# Patient Record
Sex: FEMALE | Race: WHITE | NOT HISPANIC OR LATINO | Employment: UNEMPLOYED | URBAN - METROPOLITAN AREA
[De-identification: names, ages, dates, MRNs, and addresses within clinical notes are randomized per-mention and may not be internally consistent; named-entity substitution may affect disease eponyms.]

---
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
## 2025-01-10 ENCOUNTER — APPOINTMENT (OUTPATIENT)
Dept: LAB | Facility: CLINIC | Age: 20
End: 2025-01-10
Payer: COMMERCIAL

## 2025-01-10 DIAGNOSIS — T78.1XXD ADVERSE REACTION TO FOOD, SUBSEQUENT ENCOUNTER: ICD-10-CM

## 2025-01-10 DIAGNOSIS — Z91.018 ALLERGY TO OTHER FOODS: ICD-10-CM

## 2025-01-10 PROCEDURE — 86003 ALLG SPEC IGE CRUDE XTRC EA: CPT

## 2025-01-10 PROCEDURE — 86008 ALLG SPEC IGE RECOMB EA: CPT

## 2025-01-10 PROCEDURE — 36415 COLL VENOUS BLD VENIPUNCTURE: CPT

## 2025-01-13 LAB
ALMOND IGE QN: 21.9 KUA/I (ref 0–0.1)
BRAZIL NUT IGE QN: 13.1 KUA/I (ref 0–0.1)
CASHEW NUT IGE QN: 48.9 KUA/I (ref 0–0.1)
CLAM IGE QN: 0.23 KUA/L (ref 0–0.1)
CRAB IGE QN: 0.51 KUA/L (ref 0–0.1)
HAZELNUT IGE QN: 31.9 KUA/L (ref 0–0.1)
LENTILS IGE QN: 13.5 KU/L
LOBSTER IGE QN: 0.95 KUA/L (ref 0–0.1)
MISCELLANEOUS LAB TEST RESULT: NORMAL
OYSTER IGE QN: 0.11 KUA/L (ref 0–0.1)
PEA IGE QN: 13.4 KU/L
PEANUT IGE QN: 25.7 KUA/I (ref 0–0.1)
PECAN/HICK NUT IGE QN: 22.7 KUA/I (ref 0–0.1)
PISTACHIO IGE QN: 64.9 KUA/I (ref 0–0.1)
SCALLOP IGE QN: 0.23 KUA/L (ref 0–0.1)
SESAME SEED IGE QN: 30 KUA/I (ref 0–0.1)
SHRIMP IGE QN: 3.19 KUA/L (ref 0–0.1)

## 2025-01-15 LAB
ARA H6 PEANUT: 6.46 KUA/I (ref 0–0.1)
PEANUT (RARA H) 1 IGE QN: 5.14 KUA/I (ref 0–0.1)
PEANUT (RARA H) 2 IGE QN: 9.03 KUA/I (ref 0–0.1)
PEANUT (RARA H) 3 IGE QN: 2.76 KUA/I (ref 0–0.1)
PEANUT (RARA H) 8 IGE QN: <0.1 KUA/I (ref 0–0.1)
PEANUT (RARA H) 9 IGE QN: 0.12 KUA/I (ref 0–0.1)

## 2025-01-17 LAB — MISCELLANEOUS LAB TEST RESULT: NORMAL

## 2025-02-24 ENCOUNTER — TELEPHONE (OUTPATIENT)
Dept: GASTROENTEROLOGY | Facility: CLINIC | Age: 20
End: 2025-02-24

## 2025-02-24 NOTE — TELEPHONE ENCOUNTER
Called and left a message for the patient to call back to reschedule office visit from 3/19 to 3/27 due to a change in the provider's schedule. That day and time is on hold for the patient.

## 2025-03-03 NOTE — TELEPHONE ENCOUNTER
Called and left a message for the patient to call back to reschedule office visit from 3/19 to 3/27 due to a change in the provider's schedule. That day and time is on hold for the patient. Sent letter via myc and canceled appt

## 2025-03-19 DIAGNOSIS — Z78.9 USES BIRTH CONTROL: ICD-10-CM

## 2025-03-19 RX ORDER — ETONOGESTREL AND ETHINYL ESTRADIOL VAGINAL RING .015; .12 MG/D; MG/D
RING VAGINAL
Qty: 3 EACH | Refills: 0 | Status: SHIPPED | OUTPATIENT
Start: 2025-03-19

## 2025-03-19 NOTE — TELEPHONE ENCOUNTER
Reason for call:   [x] Refill   [] Prior Auth  [] Other:     Office:   [x] PCP/Provider -   [] Specialty/Provider -     Medication: NuvaRing     Dose/Frequency: 0.12-0.015 mg/24 hr vaginal ring. Insert vaginally and leave in place for 3 consecutive weeks    Quantity: 3 each     Pharmacy: [facility]/pharm[facility] #[id number] - [place] - [address] [place] [address] [phone]     Local Pharmacy   Does the patient have enough for 3 days?   [] Yes   [x] No - Send as HP to POD    Mail Away Pharmacy   Does the patient have enough for 10 days?   [] Yes   [] No - Send as HP to POD

## 2025-05-30 ENCOUNTER — PREP FOR PROCEDURE (OUTPATIENT)
Dept: GASTROENTEROLOGY | Facility: CLINIC | Age: 20
End: 2025-05-30

## 2025-05-30 ENCOUNTER — TELEPHONE (OUTPATIENT)
Dept: GASTROENTEROLOGY | Facility: CLINIC | Age: 20
End: 2025-05-30

## 2025-05-30 DIAGNOSIS — R13.10 DYSPHAGIA, UNSPECIFIED TYPE: Primary | ICD-10-CM

## 2025-05-30 RX ORDER — SODIUM CHLORIDE, SODIUM LACTATE, POTASSIUM CHLORIDE, CALCIUM CHLORIDE 600; 310; 30; 20 MG/100ML; MG/100ML; MG/100ML; MG/100ML
125 INJECTION, SOLUTION INTRAVENOUS CONTINUOUS
OUTPATIENT
Start: 2025-05-30

## 2025-05-30 NOTE — TELEPHONE ENCOUNTER
----- Message from Irina Torrez DO sent at 5/30/2025  9:01 AM EDT -----  Regarding: schedule egd  Corrina,Will you please ask [facility] lab if they will do eGD for Meghan next week while I am the [facility] inpt doc.  She is the daughter of the chief of medicine Dr. Hernandez.  Order placed.Thank you!Magdalena

## 2025-05-30 NOTE — TELEPHONE ENCOUNTER
Scheduled date of EGD(as of today):06.06.25  Physician performing EGD:DR TORREZ  Location of EGD:[facility]  Instructions reviewed with patient by:SENT VIA [employer]  Clearances: N/A  Ok'd by Dr Torrez, Maddie Rubalcava Lm for pt on mother's vm with scheduled date and location.  Asked either pt or mother to cb to confirm or if reschedule needed

## 2025-06-05 ENCOUNTER — OFFICE VISIT (OUTPATIENT)
Dept: URGENT CARE | Facility: CLINIC | Age: 20
End: 2025-06-05
Payer: COMMERCIAL

## 2025-06-05 VITALS
RESPIRATION RATE: 16 BRPM | HEART RATE: 99 BPM | BODY MASS INDEX: 19.25 KG/M2 | WEIGHT: 126.6 LBS | OXYGEN SATURATION: 99 % | DIASTOLIC BLOOD PRESSURE: 66 MMHG | TEMPERATURE: 97.5 F | SYSTOLIC BLOOD PRESSURE: 102 MMHG

## 2025-06-05 DIAGNOSIS — J02.9 SORE THROAT: Primary | ICD-10-CM

## 2025-06-05 DIAGNOSIS — J02.0 STREP THROAT: ICD-10-CM

## 2025-06-05 LAB — S PYO AG THROAT QL: NEGATIVE

## 2025-06-05 PROCEDURE — 99203 OFFICE O/P NEW LOW 30 MIN: CPT

## 2025-06-05 PROCEDURE — 87880 STREP A ASSAY W/OPTIC: CPT

## 2025-06-05 RX ORDER — AMOXICILLIN 500 MG/1
500 CAPSULE ORAL EVERY 12 HOURS SCHEDULED
Qty: 20 CAPSULE | Refills: 0 | Status: SHIPPED | OUTPATIENT
Start: 2025-06-05 | End: 2025-06-15

## 2025-06-05 NOTE — LETTER
June 5, 2025     Patient: Meghan Hernandez   YOB: 2005   Date of Visit: 6/5/2025       To Whom It May Concern:    It is my medical opinion that Meghan Hernandez may return to work on 06/07/2025.    If you have any questions or concerns, please don't hesitate to call.         Sincerely,        RAQUEL Meyers    CC: No Recipients

## 2025-06-05 NOTE — PATIENT INSTRUCTIONS
Saline nasal spray as often as needed in each nostril    Flonase nasal spray 1 spray to each nostril daily    You can gargle with salt water    Tylenol Motrin for fever or discomfort    Take the antibiotic as prescribed    After 24 hours change your toothbrush and toothpaste.    No sharing cups or kissing anyone.

## 2025-06-05 NOTE — PROGRESS NOTES
[facility]        NAME: Meghan Hernandez is a 19 y.o. female  : 2005    MRN: [id number]  DATE: 2025  TIME: 11:22 AM    Assessment and Plan   Sore throat [J02.9]  1. Sore throat  POCT rapid ANTIGEN strepA      2. Strep throat  amoxicillin (AMOXIL) 500 mg capsule            Patient Instructions     Saline nasal spray as often as needed in each nostril    Flonase nasal spray 1 spray to each nostril daily    You can gargle with salt water    Tylenol Motrin for fever or discomfort    Take the antibiotic as prescribed    After 24 hours change your toothbrush and toothpaste.    No sharing cups or kissing anyone.  Follow up with PCP in 3-5 days.  Proceed to  ER if symptoms worsen.    If tests have been performed at [facility], our office will contact you with results if changes need to be made to the care plan discussed with you at the visit.  [facility] on [facility]    Chief Complaint     Chief Complaint   Patient presents with    Sore Throat     Started on Monday, hurts to swallow, swollen lymph nodes, white spots on back of throat, she hasn't been managing with any meds, denies fever, concerned for strep         History of Present Illness       This is a 19-year-old female who presents today with sore throat.  She states that she was at a parade on .  Monday she started with a scratchy throat and its gotten worse.  Last night she started with a headache.  She does have nasal congestion, postnasal drip.  She denies any cough any fullness in her ears, fever or chills.  But strep in the office was negative but will treat for strep secondary to symptoms.    Sore Throat   Associated symptoms include congestion and headaches. Pertinent negatives include no coughing, shortness of breath or stridor.       Review of Systems   Review of Systems   Constitutional: Negative.  Negative for chills, diaphoresis, fatigue and fever.   HENT:  Positive for congestion, postnasal  drip and sore throat.    Respiratory:  Negative for cough, shortness of breath, wheezing and stridor.    Cardiovascular: Negative.    Gastrointestinal: Negative.    Genitourinary: Negative.    Neurological:  Positive for headaches.         Current Medications     Current Medications[1]    Current Allergies     Allergies as of 06/05/2025 - Reviewed 06/05/2025   Allergen Reaction Noted    Peanut oil - food allergy Anaphylaxis 04/10/2017    Shellfish-derived products - food allergy Anaphylaxis 10/12/2022            The following portions of the patient's history were reviewed and updated as appropriate: allergies, current medications, past family history, past medical history, past social history, past surgical history and problem list.     Past Medical History[2]    Past Surgical History[3]    Family History[4]      Medications have been verified.        Objective   /66   Pulse 99   Temp 97.5 °F (36.4 °C) (Tympanic)   Resp 16   Wt 57.4 kg (126 lb 9.6 oz)   LMP 05/21/2025 (Approximate)   SpO2 99%   BMI 19.25 kg/m²   Patient's last menstrual period was 05/21/2025 (approximate).       Physical Exam     Physical Exam  Vitals reviewed.   Constitutional:       General: She is not in acute distress.     Appearance: Normal appearance. She is not ill-appearing.   HENT:      Head: Normocephalic and atraumatic.      Right Ear: Tympanic membrane and ear canal normal.      Left Ear: Tympanic membrane and ear canal normal.      Nose: Congestion present.      Mouth/Throat:      Mouth: Mucous membranes are moist.      Pharynx: Pharyngeal swelling and posterior oropharyngeal erythema present.     Eyes:      Extraocular Movements: Extraocular movements intact.      Conjunctiva/sclera: Conjunctivae normal.       Cardiovascular:      Rate and Rhythm: Normal rate and regular rhythm.      Heart sounds: Normal heart sounds.   Pulmonary:      Effort: Pulmonary effort is normal.   Abdominal:      General: Bowel sounds are normal.       Palpations: Abdomen is soft.     Musculoskeletal:      Cervical back: Normal range of motion and neck supple.   Lymphadenopathy:      Cervical: Cervical adenopathy present.     Skin:     General: Skin is warm.     Neurological:      General: No focal deficit present.      Mental Status: She is alert.     Psychiatric:         Mood and Affect: Mood normal.         Behavior: Behavior normal.         Judgment: Judgment normal.                        [1]   Current Outpatient Medications:     amoxicillin (AMOXIL) 500 mg capsule, Take 1 capsule (500 mg total) by mouth every 12 (twelve) hours for 10 days, Disp: 20 capsule, Rfl: 0    albuterol (PROVENTIL HFA,VENTOLIN HFA) 90 mcg/act inhaler, Inhale 2 puffs every 6 (six) hours as needed for wheezing, Disp: , Rfl:     Cetirizine HCl (ZYRTEC PO), Take by mouth daily as needed, Disp: , Rfl:     EPINEPHrine (EPIPEN JR) 0.15 mg/0.3 mL SOAJ, Inject 0.15 mg into a muscle once (Patient not taking: Reported on 9/26/2023), Disp: , Rfl:     etonogestrel-ethinyl estradiol (NuvaRing) 0.12-0.015 MG/24HR vaginal ring, Insert vaginally and leave in place for 3 consecutive weeks, then remove for 1 week., Disp: 3 each, Rfl: 0    famotidine (PEPCID) 40 MG tablet, Take 1 tablet (40 mg total) by mouth daily before breakfast (Patient not taking: Reported on 5/30/2025), Disp: 90 tablet, Rfl: 3    Ibuprofen (ADVIL PO), Take by mouth as needed, Disp: , Rfl:   [2]   Past Medical History:  Diagnosis Date    Asthma     Nonintractable episodic headache 10/12/2022    Papilledema 10/12/2022    Seasonal allergies     Spell of visual disturbance 10/12/2022    Sprain of anterior talofibular ligament of left ankle 10/10/2022   [3]   Past Surgical History:  Procedure Laterality Date    FL LUMBAR PUNCTURE DIAGNOSTIC  10/18/2022    CT LAPAROSCOPIC APPENDECTOMY N/A 4/10/2017    Procedure: APPENDECTOMY LAPAROSCOPIC;  Surgeon: Chris Newton DO;  Location: [facility];  Service: General   [4]   Family  History  Problem Relation Name Age of Onset    No Known Problems Mother      No Known Problems Father      Pulmonary fibrosis Paternal Grandfather      Diabetes type I Paternal Uncle      Alcohol abuse Neg Hx      Substance Abuse Neg Hx      Mental illness Neg Hx

## 2025-06-09 ENCOUNTER — TELEPHONE (OUTPATIENT)
Dept: DERMATOLOGY | Facility: CLINIC | Age: 20
End: 2025-06-09

## 2025-06-09 NOTE — TELEPHONE ENCOUNTER
...Bumped from Dr Alexandre 12/22/25....LMOM advising appt Dr Alexandre on 12/22/25 needed to be r/s, due to the provider being on-call that week, to please call the office to confirm or r/s. When pt calls back schedule  appropriately, I do not have anything else held.

## 2025-06-11 ENCOUNTER — HOSPITAL ENCOUNTER (OUTPATIENT)
Dept: GASTROENTEROLOGY | Facility: HOSPITAL | Age: 20
Setting detail: OUTPATIENT SURGERY
Discharge: HOME | End: 2025-06-11
Attending: INTERNAL MEDICINE
Payer: COMMERCIAL

## 2025-06-11 ENCOUNTER — ANESTHESIA (OUTPATIENT)
Dept: GASTROENTEROLOGY | Facility: HOSPITAL | Age: 20
End: 2025-06-11
Payer: COMMERCIAL

## 2025-06-11 ENCOUNTER — ANESTHESIA EVENT (OUTPATIENT)
Dept: GASTROENTEROLOGY | Facility: HOSPITAL | Age: 20
End: 2025-06-11
Payer: COMMERCIAL

## 2025-06-11 VITALS
HEART RATE: 86 BPM | TEMPERATURE: 96.6 F | BODY MASS INDEX: 18.94 KG/M2 | SYSTOLIC BLOOD PRESSURE: 101 MMHG | WEIGHT: 125 LBS | DIASTOLIC BLOOD PRESSURE: 61 MMHG | RESPIRATION RATE: 20 BRPM | OXYGEN SATURATION: 96 % | HEIGHT: 68 IN

## 2025-06-11 DIAGNOSIS — R13.10 DYSPHAGIA, UNSPECIFIED TYPE: ICD-10-CM

## 2025-06-11 LAB
EXT PREGNANCY TEST URINE: NEGATIVE
EXT. CONTROL: NORMAL

## 2025-06-11 PROCEDURE — 88305 TISSUE EXAM BY PATHOLOGIST: CPT | Performed by: PATHOLOGY

## 2025-06-11 PROCEDURE — 81025 URINE PREGNANCY TEST: CPT | Performed by: STUDENT IN AN ORGANIZED HEALTH CARE EDUCATION/TRAINING PROGRAM

## 2025-06-11 PROCEDURE — 43239 EGD BIOPSY SINGLE/MULTIPLE: CPT | Performed by: INTERNAL MEDICINE

## 2025-06-11 RX ORDER — SODIUM CHLORIDE, SODIUM LACTATE, POTASSIUM CHLORIDE, CALCIUM CHLORIDE 600; 310; 30; 20 MG/100ML; MG/100ML; MG/100ML; MG/100ML
125 INJECTION, SOLUTION INTRAVENOUS CONTINUOUS
Status: DISCONTINUED | OUTPATIENT
Start: 2025-06-11 | End: 2025-06-15 | Stop reason: HOSPADM

## 2025-06-11 RX ORDER — SODIUM CHLORIDE 9 MG/ML
INJECTION, SOLUTION INTRAVENOUS CONTINUOUS PRN
Status: DISCONTINUED | OUTPATIENT
Start: 2025-06-11 | End: 2025-06-11

## 2025-06-11 RX ORDER — LIDOCAINE HYDROCHLORIDE 10 MG/ML
INJECTION, SOLUTION EPIDURAL; INFILTRATION; INTRACAUDAL; PERINEURAL AS NEEDED
Status: DISCONTINUED | OUTPATIENT
Start: 2025-06-11 | End: 2025-06-11

## 2025-06-11 RX ORDER — PROPOFOL 10 MG/ML
INJECTION, EMULSION INTRAVENOUS AS NEEDED
Status: DISCONTINUED | OUTPATIENT
Start: 2025-06-11 | End: 2025-06-11

## 2025-06-11 RX ADMIN — PROPOFOL 200 MG: 10 INJECTION, EMULSION INTRAVENOUS at 10:05

## 2025-06-11 RX ADMIN — SODIUM CHLORIDE: 0.9 INJECTION, SOLUTION INTRAVENOUS at 10:01

## 2025-06-11 RX ADMIN — PROPOFOL 50 MG: 10 INJECTION, EMULSION INTRAVENOUS at 10:07

## 2025-06-11 RX ADMIN — LIDOCAINE HYDROCHLORIDE 50 MG: 10 INJECTION, SOLUTION EPIDURAL; INFILTRATION; INTRACAUDAL; PERINEURAL at 10:05

## 2025-06-11 RX ADMIN — PROPOFOL 100 MCG/KG/MIN: 10 INJECTION, EMULSION INTRAVENOUS at 10:06

## 2025-06-11 RX ADMIN — PROPOFOL 50 MG: 10 INJECTION, EMULSION INTRAVENOUS at 10:13

## 2025-06-11 NOTE — ANESTHESIA PREPROCEDURE EVALUATION
Procedure:  EGD    Relevant Problems   No relevant active problems        Physical Exam    Airway     Mallampati score: I  TM Distance: >3 FB  Neck ROM: full      Cardiovascular  Cardiovascular exam normal    Dental       Pulmonary  Pulmonary exam normal     Neurological      Other Findings  post-pubertal.      Anesthesia Plan  ASA Score- 1     Anesthesia Type- IV sedation with anesthesia with ASA Monitors.         Additional Monitors:     Airway Plan:            Plan Factors-Exercise tolerance (METS): >4 METS.    Chart reviewed. EKG reviewed.  Existing labs reviewed. Patient summary reviewed.                  Induction- intravenous.    Postoperative Plan- .   Monitoring Plan - Monitoring plan - standard ASA monitoring  Post Operative Pain Plan - multimodal analgesia    Perioperative Resuscitation Plan - Level 1 - Full Code.       Informed Consent- Anesthetic plan and risks discussed with patient.  I personally reviewed this patient with the CRNA. Discussed and agreed on the Anesthesia Plan with the CRNA..      NPO Status:  Vitals Value Taken Time   Date of last liquid 06/10/25 06/11/25 09:23   Time of last liquid 2200 06/11/25 09:23   Date of last solid 06/10/25 06/11/25 09:23   Time of last solid 2200 06/11/25 09:23

## 2025-06-11 NOTE — ANESTHESIA POSTPROCEDURE EVALUATION
Post-Op Assessment Note    CV Status:  Stable  Pain Score: 0    Pain management: adequate       Mental Status:  Alert and awake   Hydration Status:  Euvolemic   PONV Controlled:  Controlled   Airway Patency:  Patent     Post Op Vitals Reviewed: Yes    No anethesia notable event occurred.    Staff: CRNA           Last Filed PACU Vitals:  Vitals Value Taken Time   Temp 96.6 °F (35.9 °C) 06/11/25 10:30   Pulse 101 06/11/25 10:30   /60 06/11/25 10:30   Resp 20 06/11/25 10:30   SpO2 95 % 06/11/25 10:30       Modified Rober:     Vitals Value Taken Time   Activity 2 06/11/25 10:31   Respiration 2 06/11/25 10:31   Circulation 2 06/11/25 10:31   Consciousness 1 06/11/25 10:31   Oxygen Saturation 2 06/11/25 10:31     Modified Rober Score: 9

## 2025-06-11 NOTE — H&P
History and Physical -  [facility]  Meghan Hernandez 19 y.o. female MRN: [id number]                  HPI: Meghan Hernandez is a 19 y.o. year old female who presents for upper endoscopy for dysphagia.      REVIEW OF SYSTEMS: Per the HPI, and otherwise unremarkable.    Historical Information   Past Medical History[1]  Past Surgical History[2]  Social History   Social History     Substance and Sexual Activity   Alcohol Use Yes    Comment: sometimes     Social History     Substance and Sexual Activity   Drug Use Never     Tobacco Use History[3]  Family History[4]    Meds/Allergies     Current Medications[5]    Allergies[6]    Objective     LMP 05/21/2025 (Approximate)       PHYSICAL EXAM    Gen: NAD  Head: NCAT  CV: RRR  CHEST: Clear  ABD: soft, NT/ND  EXT: no edema      ASSESSMENT/PLAN:  This is a 19 y.o. year old female here for EGD, and she is stable and optimized for her procedure.             [1]   Past Medical History:  Diagnosis Date    Asthma     Nonintractable episodic headache 10/12/2022    Papilledema 10/12/2022    Seasonal allergies     Spell of visual disturbance 10/12/2022    Sprain of anterior talofibular ligament of left ankle 10/10/2022   [2]   Past Surgical History:  Procedure Laterality Date    FL LUMBAR PUNCTURE DIAGNOSTIC  10/18/2022    [place] LAPAROSCOPIC APPENDECTOMY N/A 4/10/2017    Procedure: APPENDECTOMY LAPAROSCOPIC;  Surgeon: Chris Newton DO;  [facility];  Service: General   [3]   Social History  Tobacco Use   Smoking Status Never   Smokeless Tobacco Never   [4]   Family History  Problem Relation Name Age of Onset    No Known Problems Mother      No Known Problems Father      Pulmonary fibrosis Paternal Grandfather      Diabetes type I Paternal Uncle      Alcohol abuse Neg Hx      Substance Abuse Neg Hx      Mental illness Neg Hx     [5]   Current Outpatient Medications:     albuterol (PROVENTIL HFA,VENTOLIN HFA) 90 mcg/act inhaler    amoxicillin (AMOXIL) 500 mg  capsule    Cetirizine HCl (ZYRTEC PO)    EPINEPHrine (EPIPEN JR) 0.15 mg/0.3 mL SOAJ    etonogestrel-ethinyl estradiol (NuvaRing) 0.12-0.015 MG/24HR vaginal ring    famotidine (PEPCID) 40 MG tablet    Ibuprofen (ADVIL PO)  [6]   Allergies  Allergen Reactions    Peanut Oil - Food Allergy Anaphylaxis     Sesame and peanut oil;  Chick peas and lentils      Shellfish-Derived Products - Food Allergy Anaphylaxis

## 2025-06-18 PROCEDURE — 88305 TISSUE EXAM BY PATHOLOGIST: CPT | Performed by: PATHOLOGY

## 2025-06-19 ENCOUNTER — RESULTS FOLLOW-UP (OUTPATIENT)
Dept: GASTROENTEROLOGY | Facility: CLINIC | Age: 20
End: 2025-06-19

## 2025-06-19 DIAGNOSIS — K20.0 EOSINOPHILIC ESOPHAGITIS: Primary | ICD-10-CM

## 2025-06-19 RX ORDER — OMEPRAZOLE 40 MG/1
40 CAPSULE, DELAYED RELEASE ORAL DAILY
Qty: 90 CAPSULE | Refills: 3 | Status: SHIPPED | OUTPATIENT
Start: 2025-06-19

## 2025-06-24 ENCOUNTER — PREP FOR PROCEDURE (OUTPATIENT)
Dept: GASTROENTEROLOGY | Facility: CLINIC | Age: 20
End: 2025-06-24

## 2025-06-24 ENCOUNTER — TELEPHONE (OUTPATIENT)
Dept: GASTROENTEROLOGY | Facility: CLINIC | Age: 20
End: 2025-06-24

## 2025-06-24 DIAGNOSIS — K20.0 EOSINOPHILIC ESOPHAGITIS: Primary | ICD-10-CM

## 2025-06-24 RX ORDER — SODIUM CHLORIDE, SODIUM LACTATE, POTASSIUM CHLORIDE, CALCIUM CHLORIDE 600; 310; 30; 20 MG/100ML; MG/100ML; MG/100ML; MG/100ML
125 INJECTION, SOLUTION INTRAVENOUS CONTINUOUS
OUTPATIENT
Start: 2025-06-24

## 2025-06-24 NOTE — TELEPHONE ENCOUNTER
----- Message from Maranda PORTILLO sent at 6/24/2025 12:09 PM EDT -----    ----- Message -----  From: Irina Torrez DO  Sent: 6/24/2025  11:46 AM EDT  [facility]    Please call pt and get her scheduled for repeat egd in 8 weeks after taking her omeprazole as prescribed.      Thank you,  Magdalena    She is the daughter of Dr. Hernandez, chief of Internal Medicine

## 2025-06-26 ENCOUNTER — TELEPHONE (OUTPATIENT)
Dept: GASTROENTEROLOGY | Facility: CLINIC | Age: 20
End: 2025-06-26

## 2025-06-26 NOTE — TELEPHONE ENCOUNTER
LM for patient to call and schedule EGD.    ----- Message from Maranda PORTILLO sent at 6/24/2025 12:09 PM EDT -----    ----- Message -----  From: Irina Torrez DO  Sent: 6/24/2025  11:46 AM EDT  To: [facility]    Please call pt and get her scheduled for repeat egd in 8 weeks after taking her omeprazole as prescribed.      Thank you,  Magdalena    She is the daughter of Dr. Hernandez, chief of Internal Medicine

## 2025-06-27 ENCOUNTER — TELEPHONE (OUTPATIENT)
Dept: GASTROENTEROLOGY | Facility: CLINIC | Age: 20
End: 2025-06-27

## 2025-06-27 NOTE — TELEPHONE ENCOUNTER
Per savage Schroeder to schedule with Dr. Gandhi     Scheduled date of EGD(as of today): 08/14/2025  Physician performing EGD: Dr. Gandhi   [facility] of EGD: [facility]  Instructions reviewed with patient by: Onelia Madera   Clearances: N/A

## 2025-06-27 NOTE — TELEPHONE ENCOUNTER
Per Dr. Shan wan to have Dr. Gandhi for EGD      Scheduled date of EGD(as of today): 08/14/2025  Physician performing EGD: Dr. Gandhi   Location of EGD: [facility]  Instructions reviewed with patient by: Onelia guan   Clearances: N/A

## 2025-06-27 NOTE — TELEPHONE ENCOUNTER
----- Message from Irina Torrez DO sent at 6/26/2025  4:11 PM EDT -----  GI staff:  Will you please see if Dr. Worley would be able to do her follow up egd the week of August when he is on inpatient.  I am on vacation in August and wont' be able to do it before she has to go back to college.    Ervin- new diagnosis of EOe, treated with PPI coming for repeat EGD for 8 week follow up.  Memo's daughter.      Memo is aware it won't be me since I'm on vacation.      Thanks all,    Magdalena

## 2025-08-13 ENCOUNTER — ANESTHESIA EVENT (OUTPATIENT)
Dept: ANESTHESIOLOGY | Facility: HOSPITAL | Age: 20
End: 2025-08-13

## 2025-08-13 ENCOUNTER — ANESTHESIA (OUTPATIENT)
Dept: ANESTHESIOLOGY | Facility: HOSPITAL | Age: 20
End: 2025-08-13

## 2025-08-14 ENCOUNTER — ANESTHESIA (OUTPATIENT)
Dept: GASTROENTEROLOGY | Facility: HOSPITAL | Age: 20
End: 2025-08-14
Payer: COMMERCIAL

## 2025-08-14 ENCOUNTER — HOSPITAL ENCOUNTER (OUTPATIENT)
Dept: GASTROENTEROLOGY | Facility: HOSPITAL | Age: 20
Setting detail: OUTPATIENT SURGERY
End: 2025-08-14
Attending: INTERNAL MEDICINE
Payer: COMMERCIAL

## 2025-08-14 ENCOUNTER — ANESTHESIA EVENT (OUTPATIENT)
Dept: GASTROENTEROLOGY | Facility: HOSPITAL | Age: 20
End: 2025-08-14
Payer: COMMERCIAL

## 2025-08-22 ENCOUNTER — TELEPHONE (OUTPATIENT)
Dept: GASTROENTEROLOGY | Facility: CLINIC | Age: 20
End: 2025-08-22

## (undated) DEVICE — ENDOPATH XCEL BLADELESS TROCARS WITH STABILITY SLEEVES: Brand: ENDOPATH XCEL

## (undated) DEVICE — SUT MONOCRYL 4-0 PS-2 18 IN Y496G

## (undated) DEVICE — 2000CC GUARDIAN II: Brand: GUARDIAN

## (undated) DEVICE — ENDOSCOPIC LINEAR CUTTER RELOADS BLUE 3.5MM: Brand: ECHELON; ENDOPATH

## (undated) DEVICE — THE ECHELON FLEX POWERED PLUS ARTICULATING ENDOSCOPIC LINEAR CUTTERS ARE STERILE, SINGLE PATIENT USE INSTRUMENTS THAT SIMULTANEOUSLYCUT AND STAPLE TISSUE. THERE ARE SIX STAGGERED ROWS OF STAPLES, THREE ON EITHER SIDE OF THE CUT LINE. THE ECHELON FLEX 45 POWERED PLUSINSTRUMENTS HAVE A STAPLE LINE THAT IS APPROXIMATELY 45 MM LONG AND A CUT LINE THAT IS APPROXIMATELY 42 MM LONG. THE SHAFT CAN ROTATE FREELYIN BOTH DIRECTIONS AND AN ARTICULATION MECHANISM ENABLES THE DISTAL PORTION OF THE SHAFT TO PIVOT TO FACILITATE LATERAL ACCESS TO THE OPERATIVESITE.THE INSTRUMENTS ARE PACKAGED WITH A PRIMARY LITHIUM BATTERY PACK THAT MUST BE INSTALLED PRIOR TO USE. THERE ARE SPECIFIC REQUIREMENTS FORDISPOSING OF THE BATTERY PACK. REFER TO THE BATTERY PACK DISPOSAL SECTION.THE INSTRUMENTS ARE PACKAGED WITHOUT A RELOAD AND MUST BE LOADED PRIOR TO USE. A STAPLE RETAINING CAP ON THE RELOAD PROTECTS THE STAPLE LEGPOINTS DURING SHIPPING AND TRANSPORTATION. THE INSTRUMENTS’ LOCK-OUT FEATURE IS DESIGNED TO PREVENT A USED OR IMPROPERLY INSTALLED RELOADFROM BEING REFIRED OR AN INSTRUMENT FROM BEING FIRED WITHOUT A RELOAD.: Brand: ECHELON FLEX

## (undated) DEVICE — PACK PBDS LAP CHOLE RF

## (undated) DEVICE — GLOVE SRG BIOGEL ORTHOPEDIC 8

## (undated) DEVICE — STRL COTTON TIP APPLCTR 6IN PK: Brand: CARDINAL HEALTH

## (undated) DEVICE — INTENDED FOR TISSUE SEPARATION, AND OTHER PROCEDURES THAT REQUIRE A SHARP SURGICAL BLADE TO PUNCTURE OR CUT.: Brand: BARD-PARKER SAFETY BLADES SIZE 11, STERILE

## (undated) DEVICE — 3000CC GUARDIAN II: Brand: GUARDIAN

## (undated) DEVICE — SUT VICRYL 0 UR-6 27 IN J603H

## (undated) DEVICE — ENDOPATH XCEL UNIVERSAL TROCAR STABLILITY SLEEVES: Brand: ENDOPATH XCEL

## (undated) DEVICE — CATH FOLEY 12FR 5ML 2 WAY SILICONE ELASTIMER

## (undated) DEVICE — NEEDLE 22 G X 1 1/2 SAFETY

## (undated) DEVICE — ADHESIVE SKN CLSR HISTOACRYL FLEX 0.5ML LF

## (undated) DEVICE — CHLORAPREP HI-LITE 26ML ORANGE

## (undated) DEVICE — HARMONIC ACE 5MM DIAMETER SHEARS 36CM SHAFT LENGTH + ADAPTIVE TISSUE TECHNOLOGY FOR USE WITH GENERATOR G11: Brand: HARMONIC ACE

## (undated) DEVICE — TRAY FOLEY 16FR URIMETER SURESTEP

## (undated) DEVICE — ENDOPOUCH RETRIEVER SPECIMEN RETRIEVAL BAGS: Brand: ENDOPOUCH RETRIEVER